# Patient Record
Sex: MALE | Race: WHITE | Employment: OTHER | URBAN - METROPOLITAN AREA
[De-identification: names, ages, dates, MRNs, and addresses within clinical notes are randomized per-mention and may not be internally consistent; named-entity substitution may affect disease eponyms.]

---

## 2024-02-13 RX ORDER — BIOTIN 10 MG
TABLET ORAL
COMMUNITY

## 2024-02-13 RX ORDER — METOPROLOL SUCCINATE 25 MG/1
25 TABLET, EXTENDED RELEASE ORAL
COMMUNITY

## 2024-02-13 RX ORDER — METHYLCELLULOSE 500 MG/1
500 TABLET ORAL
COMMUNITY

## 2024-02-13 RX ORDER — CETIRIZINE HYDROCHLORIDE 10 MG/1
10 TABLET ORAL
COMMUNITY

## 2024-02-13 NOTE — FLOWSHEET NOTE
[facility]  Endoscopy Preprocedure Instructions      1. On the day of your surgery, please report to registration located on the 2nd floor of the  the Main Hospital. yes    2. You must have a responsible adult to drive you to the hospital, stay at the hospital during your procedure and drive you home. If they leave your procedure will not be started (no exceptions). yes    3. Do not have anything to eat or drink (including water, gum, mints, coffee, and juice) after midnight. This does not apply to the medications you were instructed to take by your physician.yes  If you are currently taking Plavix, Coumadin, Aspirin, or other blood-thinning agents, contact your physician for special instructions. not applicable    4. If you are having a procedure that requires bowel prep: We recommend that you have only clear liquids the day before your procedure and begin your bowel prep by 5:00 pm.  You may continue to drink clear liquids until midnight.  If for any reason you are not able to complete your prep please notify your physician immediately. yes    5. Have a list of all current medications, including vitamins, herbal supplements and any other over the counter medications. Reviewed over the phone    6. If you wear glasses, contacts, dentures and/or hearing aids, they may be removed prior to procedure, please bring a case to store them in. yes    7. You should understand that if you do not follow these instructions your procedure may be cancelled.  If your physical condition changes (I.e. fever, cold or flu) please contact your doctor as soon as possible.    8. It is important that you be on time.  If for any reason you are unable to keep your appointment please call [phone] the day of or your physician’s office prior to your scheduled procedure    9. Have you received your COVID Vaccine? yes If no, you will need to receive a COVID test/swab here at [facility] the [facility] parking lot Monday - Friday

## 2024-02-14 ENCOUNTER — ANESTHESIA (OUTPATIENT)
Facility: HOSPITAL | Age: 64
End: 2024-02-14
Payer: MEDICARE

## 2024-02-14 ENCOUNTER — ANESTHESIA EVENT (OUTPATIENT)
Facility: HOSPITAL | Age: 64
End: 2024-02-14
Payer: MEDICARE

## 2024-02-14 ENCOUNTER — HOSPITAL ENCOUNTER (OUTPATIENT)
Facility: HOSPITAL | Age: 64
Setting detail: OUTPATIENT SURGERY
Discharge: HOME | End: 2024-02-14
Attending: INTERNAL MEDICINE | Admitting: INTERNAL MEDICINE
Payer: MEDICARE

## 2024-02-14 VITALS
SYSTOLIC BLOOD PRESSURE: 135 MMHG | DIASTOLIC BLOOD PRESSURE: 83 MMHG | WEIGHT: 176.37 LBS | RESPIRATION RATE: 17 BRPM | HEART RATE: 67 BPM | TEMPERATURE: 98.6 F | HEIGHT: 69 IN | OXYGEN SATURATION: 97 % | BODY MASS INDEX: 26.12 KG/M2

## 2024-02-14 PROCEDURE — 6360000002 HC RX W HCPCS: Performed by: NURSE ANESTHETIST, CERTIFIED REGISTERED

## 2024-02-14 PROCEDURE — 7100000011 HC PHASE II RECOVERY - ADDTL 15 MIN: Performed by: INTERNAL MEDICINE

## 2024-02-14 PROCEDURE — 2580000003 HC RX 258: Performed by: INTERNAL MEDICINE

## 2024-02-14 PROCEDURE — 3700000000 HC ANESTHESIA ATTENDED CARE: Performed by: INTERNAL MEDICINE

## 2024-02-14 PROCEDURE — 3600007502: Performed by: INTERNAL MEDICINE

## 2024-02-14 PROCEDURE — 3700000001 HC ADD 15 MINUTES (ANESTHESIA): Performed by: INTERNAL MEDICINE

## 2024-02-14 PROCEDURE — 2500000003 HC RX 250 WO HCPCS: Performed by: NURSE ANESTHETIST, CERTIFIED REGISTERED

## 2024-02-14 PROCEDURE — 3600007512: Performed by: INTERNAL MEDICINE

## 2024-02-14 PROCEDURE — 7100000010 HC PHASE II RECOVERY - FIRST 15 MIN: Performed by: INTERNAL MEDICINE

## 2024-02-14 RX ORDER — SODIUM CHLORIDE 9 MG/ML
INJECTION, SOLUTION INTRAVENOUS CONTINUOUS
Status: DISCONTINUED | OUTPATIENT
Start: 2024-02-14 | End: 2024-02-14 | Stop reason: HOSPADM

## 2024-02-14 RX ORDER — PROPOFOL 10 MG/ML
INJECTION, EMULSION INTRAVENOUS CONTINUOUS PRN
Status: DISCONTINUED | OUTPATIENT
Start: 2024-02-14 | End: 2024-02-14 | Stop reason: SDUPTHER

## 2024-02-14 RX ORDER — SIMETHICONE 20 MG/.3ML
EMULSION ORAL
Status: DISCONTINUED
Start: 2024-02-14 | End: 2024-02-14 | Stop reason: HOSPADM

## 2024-02-14 RX ADMIN — SODIUM CHLORIDE: 9 INJECTION, SOLUTION INTRAVENOUS at 08:43

## 2024-02-14 RX ADMIN — PROPOFOL 70 MG: 10 INJECTION, EMULSION INTRAVENOUS at 08:46

## 2024-02-14 RX ADMIN — PROPOFOL 140 MCG/KG/MIN: 10 INJECTION, EMULSION INTRAVENOUS at 08:47

## 2024-02-14 RX ADMIN — PROPOFOL 20 MG: 10 INJECTION, EMULSION INTRAVENOUS at 08:52

## 2024-02-14 RX ADMIN — LIDOCAINE HYDROCHLORIDE 40 MG: 20 INJECTION, SOLUTION INFILTRATION; PERINEURAL at 08:46

## 2024-02-14 RX ADMIN — SODIUM CHLORIDE: 9 INJECTION, SOLUTION INTRAVENOUS at 09:03

## 2024-02-14 NOTE — ANESTHESIA POSTPROCEDURE EVALUATION
Department of Anesthesiology  Postprocedure Note    Patient: Brian Haider  MRN: [id number]  YOB: 1960  Date of evaluation: 2/14/2024    Procedure Summary       Date: 02/14/24 Room / Location: [place] [address] / [facility] ENDOSCOPY    Anesthesia Start: 0843 Anesthesia Stop: 0903    Procedure: COLONOSCOPY (Lower GI Region) Diagnosis:       Colon cancer screening      (Colon cancer screening [Z12.11])    Surgeons: Juice Santos MD Responsible Provider: Sylvain Orellana MD    Anesthesia Type: MAC ASA Status: 2            Anesthesia Type: MAC    Ryann Phase I:      Ryann Phase II: Ryann Score: 9    Anesthesia Post Evaluation    Patient location during evaluation: PACU  Patient participation: complete - patient participated  Level of consciousness: awake  Pain score: 0  Airway patency: patent  Nausea & Vomiting: no nausea and no vomiting  Cardiovascular status: blood pressure returned to baseline  Respiratory status: acceptable  Hydration status: euvolemic  Multimodal analgesia pain management approach  Pain management: adequate    No notable events documented.

## 2024-02-14 NOTE — DISCHARGE INSTRUCTIONS
[facility]  [address] [place] [address]  [phone]                   Brian Haider  [id number]  1960    COLON DISCHARGE INSTRUCTIONS    DISCOMFORT:  Redness at IV site- apply warm compress to area; if redness or soreness persist- contact your physician  There may be a slight amount of blood passed from the rectum  Gaseous discomfort- walking, belching will help relieve any discomfort  You may not operate a vehicle for 12 hours  You may not  engage in an occupation involving machinery or appliances for rest of today  You may not  drink alcoholic beverages for at least 12 hours  Avoid making any critical decisions for at least 24 hour    DIET:   High fiber diet.   - however -  remember your colon is empty and a heavy meal will produce gas.   Avoid these foods:  vegetables, fried / greasy foods, carbonated drinks for today     ACTIVITY:  It is recommended that you spend the remainder of the day resting -  avoid any strenuous activity.  CALL M.D.  ANY SIGN OF:   Increasing pain, nausea, vomiting  Abdominal distension (swelling)  New increased bleeding (oral or rectal)  Fever (chills)  Pain in chest area  Bloody discharge from nose or mouth  Shortness of breath    You may resume medications    Post procedure diagnosis:  mild diverticulosis    Follow-up Instructions:    If a specimen was collected, you will receive a letter with the result by mail within two  weeks. Depending on the result this letter will specify your follow up colonoscopy date.      Please call us for any questions or concerns                     Brian Haider  [id number]  1960        DISCHARGE SUMMARY from Nurse    The following personal items collected during your admission are returned to you:   Dental Appliance:    Vision:    Hearing Aid:    Jewelry:    Clothing:    Other Valuables:    Valuables sent to safe: Dose (mL/hr) Propofol : 0 mL/hr

## 2024-02-14 NOTE — OP NOTE
[facility]  [address] [place] [address]  [phone]                   Colonoscopy Operative Report      Indications:    Screening colonoscopy     :  Juice Santos MD    Assistants: None    Referring Provider: Shannan Jones FNP    Sedation:  MAC anesthesia Propofol    Procedure Details:  After informed consent was obtained with all risks and benefits of procedure explained and preoperative exam completed, the patient was taken to the endoscopy suite and placed in the left lateral decubitus position.  Upon sequential sedation as per above, a digital rectal exam was performed  And was normal.  The Olympus videocolonoscope  was inserted in the rectum and carefully advanced to the cecum, which was identified by the ileocecal valve and appendiceal orifice, terminal ileum.  The quality of preparation was excellent.  The colonoscope was slowly withdrawn with careful evaluation between folds. Retroflexion in the rectum was performed and was normal..     Findings:   Rectum: normal  Sigmoid:     -Diverticulosis  Descending Colon: normal  Transverse Colon: normal  Ascending Colon: normal  Cecum: normal  Terminal Ileum: normal    Interventions:  none    Specimen Removed:  none    Implants: none    Complications: None.     EBL:  None.    Impression:  Mild left sided diverticulosis    Recommendations:   -For colon cancer screening in this average-risk patient, colonoscopy may be repeated in 10 years.  -High fiber diet.     -Follow up as needed  -Call office for any questions/concerns  -Resume normal medication(s).       Discharge Disposition:  Home in the company of a  when able to ambulate.    Juice Santos MD  2/14/2024  9:03 AM

## 2024-02-14 NOTE — PROGRESS NOTES

## 2024-02-14 NOTE — PROGRESS NOTES
Brian Haider  1960  [id number]    Situation:  Verbal report received from: JAI Arechiga  Procedure: Procedure(s):  COLONOSCOPY    Background:    Preoperative diagnosis: Colon cancer screening [Z12.11]  Postoperative diagnosis: * No post-op diagnosis entered *    :  Dr. Santos  Assistant(s): Circulator: Hawk Barbosa RN  Endoscopy Technician: Radames Cardoza; Margarita Gray    Specimens: * No specimens in log *  H. Pylori test: no    Assessment:    Anesthesia gave intra-procedure sedation and medications, see anesthesia flow sheet     Intravenous fluids: NS@ KVO     Vital signs stable yes    Abdominal assessment: round and soft yes    Recommendation:  Discharge patient per MD order yes.    Ride home with: wife:  juan  Permission to share finding with family or friend yes.      Endoscopy discharge instructions have been reviewed and given to patient.  The patient verbalized understanding and acceptance of instructions.      Dr. Santos discussed with spouse procedure findings and next steps.

## 2024-02-14 NOTE — ANESTHESIA PRE PROCEDURE
Department of Anesthesiology  Preprocedure Note       Name:  Brian Haider   Age:  63 y.o.  :  1960                                          MRN:  [id number]         Date:  2024      Surgeon: Surgeon(s):  Juice Santos MD    Procedure: Procedure(s):  COLONOSCOPY    Medications prior to admission:   Prior to Admission medications    Medication Sig Start Date End Date Taking? Authorizing Provider   metoprolol succinate (TOPROL XL) 25 MG extended release tablet Take 1 tablet by mouth nightly   Yes Felix Soto MD   Multiple Vitamins-Minerals (MULTIVITAMIN ADULT) CHEW Take by mouth Chews 2 po once daily.   Yes Felix Soto MD   cetirizine (ZYRTEC) 10 MG tablet Take 1 tablet by mouth nightly   Yes Felix Soto MD   methylcellulose (CITRUCEL) 500 MG TABS Take 1 tablet by mouth nightly   Yes Felix Soto MD       Current medications:    Current Facility-Administered Medications   Medication Dose Route Frequency Provider Last Rate Last Admin    0.9 % sodium chloride infusion   IntraVENous Continuous Juice Santos MD           Allergies:  No Known Allergies    Problem List:    Patient Active Problem List   Diagnosis Code    PNA (pneumonia) J18.9       Past Medical History:        Diagnosis Date    Hx of blood clots     thrombosis in left ankle - superficial clot    Hypertension        Past Surgical History:        Procedure Laterality Date    COLONOSCOPY      HERNIA REPAIR      groin area    TONSILLECTOMY         Social History:    Social History     Tobacco Use    Smoking status: Never    Smokeless tobacco: Never   Substance Use Topics    Alcohol use: Yes     Comment: rare                                Counseling given: Not Answered      Vital Signs (Current):   Vitals:    24 0816   BP: (!) 148/78   Pulse: 71   Resp: 15   Temp: 98.4 °F (36.9 °C)   TempSrc: Oral   SpO2: 98%   Weight: 80 kg (176 lb 5.9 oz)   Height: 1.753 m (5' 9\")